# Patient Record
Sex: FEMALE | Race: BLACK OR AFRICAN AMERICAN | NOT HISPANIC OR LATINO | URBAN - METROPOLITAN AREA
[De-identification: names, ages, dates, MRNs, and addresses within clinical notes are randomized per-mention and may not be internally consistent; named-entity substitution may affect disease eponyms.]

---
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
## 2023-04-25 ENCOUNTER — EMERGENCY (EMERGENCY)
Facility: HOSPITAL | Age: 23
LOS: 0 days | Discharge: HOME | End: 2023-04-25
Attending: EMERGENCY MEDICINE
Payer: MEDICAID

## 2023-04-25 VITALS
HEART RATE: 82 BPM | SYSTOLIC BLOOD PRESSURE: 144 MMHG | DIASTOLIC BLOOD PRESSURE: 91 MMHG | RESPIRATION RATE: 17 BRPM | OXYGEN SATURATION: 100 %

## 2023-04-25 VITALS
RESPIRATION RATE: 18 BRPM | DIASTOLIC BLOOD PRESSURE: 83 MMHG | SYSTOLIC BLOOD PRESSURE: 150 MMHG | OXYGEN SATURATION: 100 % | HEART RATE: 109 BPM | HEIGHT: 65 IN | TEMPERATURE: 99 F | WEIGHT: 130.07 LBS

## 2023-04-25 DIAGNOSIS — L60.0 INGROWING NAIL: ICD-10-CM

## 2023-04-25 DIAGNOSIS — Z86.79 PERSONAL HISTORY OF OTHER DISEASES OF THE CIRCULATORY SYSTEM: ICD-10-CM

## 2023-04-25 DIAGNOSIS — M20.5X1 OTHER DEFORMITIES OF TOE(S) (ACQUIRED), RIGHT FOOT: ICD-10-CM

## 2023-04-25 DIAGNOSIS — L92.9 GRANULOMATOUS DISORDER OF THE SKIN AND SUBCUTANEOUS TISSUE, UNSPECIFIED: ICD-10-CM

## 2023-04-25 DIAGNOSIS — M20.5X2 OTHER DEFORMITIES OF TOE(S) (ACQUIRED), LEFT FOOT: ICD-10-CM

## 2023-04-25 PROBLEM — R01.1 CARDIAC MURMUR, UNSPECIFIED: Chronic | Status: ACTIVE | Noted: 2021-11-11

## 2023-04-25 LAB
ALBUMIN SERPL ELPH-MCNC: 4.1 G/DL — SIGNIFICANT CHANGE UP (ref 3.5–5.2)
ALP SERPL-CCNC: 37 U/L — SIGNIFICANT CHANGE UP (ref 30–115)
ALT FLD-CCNC: <5 U/L — SIGNIFICANT CHANGE UP (ref 0–41)
ANION GAP SERPL CALC-SCNC: 8 MMOL/L — SIGNIFICANT CHANGE UP (ref 7–14)
ANISOCYTOSIS BLD QL: SLIGHT — SIGNIFICANT CHANGE UP
AST SERPL-CCNC: 83 U/L — HIGH (ref 0–41)
BASOPHILS # BLD AUTO: 0 K/UL — SIGNIFICANT CHANGE UP (ref 0–0.2)
BASOPHILS NFR BLD AUTO: 0 % — SIGNIFICANT CHANGE UP (ref 0–1)
BILIRUB SERPL-MCNC: <0.2 MG/DL — SIGNIFICANT CHANGE UP (ref 0.2–1.2)
BUN SERPL-MCNC: 8 MG/DL — LOW (ref 10–20)
CALCIUM SERPL-MCNC: 8.9 MG/DL — SIGNIFICANT CHANGE UP (ref 8.4–10.5)
CHLORIDE SERPL-SCNC: 105 MMOL/L — SIGNIFICANT CHANGE UP (ref 98–110)
CO2 SERPL-SCNC: 21 MMOL/L — SIGNIFICANT CHANGE UP (ref 17–32)
CREAT SERPL-MCNC: 0.7 MG/DL — SIGNIFICANT CHANGE UP (ref 0.7–1.5)
CRP SERPL-MCNC: <3 MG/L — SIGNIFICANT CHANGE UP
EGFR: 126 ML/MIN/1.73M2 — SIGNIFICANT CHANGE UP
EOSINOPHIL # BLD AUTO: 0.04 K/UL — SIGNIFICANT CHANGE UP (ref 0–0.7)
EOSINOPHIL NFR BLD AUTO: 0.9 % — SIGNIFICANT CHANGE UP (ref 0–8)
ERYTHROCYTE [SEDIMENTATION RATE] IN BLOOD: 45 MM/HR — HIGH (ref 0–20)
GLUCOSE SERPL-MCNC: 99 MG/DL — SIGNIFICANT CHANGE UP (ref 70–99)
HCT VFR BLD CALC: 30.5 % — LOW (ref 37–47)
HGB BLD-MCNC: 9.2 G/DL — LOW (ref 12–16)
HYPOCHROMIA BLD QL: SLIGHT — SIGNIFICANT CHANGE UP
LACTATE SERPL-SCNC: 2.3 MMOL/L — HIGH (ref 0.7–2)
LYMPHOCYTES # BLD AUTO: 1.5 K/UL — SIGNIFICANT CHANGE UP (ref 1.2–3.4)
LYMPHOCYTES # BLD AUTO: 31.8 % — SIGNIFICANT CHANGE UP (ref 20.5–51.1)
MCHC RBC-ENTMCNC: 21.9 PG — LOW (ref 27–31)
MCHC RBC-ENTMCNC: 30.2 G/DL — LOW (ref 32–37)
MCV RBC AUTO: 72.4 FL — LOW (ref 81–99)
METAMYELOCYTES # FLD: 1.8 % — HIGH (ref 0–0)
MICROCYTES BLD QL: SLIGHT — SIGNIFICANT CHANGE UP
MONOCYTES # BLD AUTO: 0.46 K/UL — SIGNIFICANT CHANGE UP (ref 0.1–0.6)
MONOCYTES NFR BLD AUTO: 9.7 % — HIGH (ref 1.7–9.3)
MYELOCYTES NFR BLD: 0.9 % — HIGH (ref 0–0)
NEUTROPHILS # BLD AUTO: 2.29 K/UL — SIGNIFICANT CHANGE UP (ref 1.4–6.5)
NEUTROPHILS NFR BLD AUTO: 48.7 % — SIGNIFICANT CHANGE UP (ref 42.2–75.2)
NRBC # BLD: 1 /100 — HIGH (ref 0–0)
NRBC # BLD: SIGNIFICANT CHANGE UP /100 WBCS (ref 0–0)
PLAT MORPH BLD: SIGNIFICANT CHANGE UP
PLATELET # BLD AUTO: 436 K/UL — HIGH (ref 130–400)
PMV BLD: 11 FL — HIGH (ref 7.4–10.4)
POTASSIUM SERPL-MCNC: SIGNIFICANT CHANGE UP MMOL/L (ref 3.5–5)
POTASSIUM SERPL-SCNC: SIGNIFICANT CHANGE UP MMOL/L (ref 3.5–5)
PROT SERPL-MCNC: 9.2 G/DL — HIGH (ref 6–8)
RBC # BLD: 4.21 M/UL — SIGNIFICANT CHANGE UP (ref 4.2–5.4)
RBC # FLD: 19.4 % — HIGH (ref 11.5–14.5)
RBC BLD AUTO: NORMAL — SIGNIFICANT CHANGE UP
SODIUM SERPL-SCNC: 134 MMOL/L — LOW (ref 135–146)
VARIANT LYMPHS # BLD: 6.2 % — HIGH (ref 0–5)
WBC # BLD: 4.71 K/UL — LOW (ref 4.8–10.8)
WBC # FLD AUTO: 4.71 K/UL — LOW (ref 4.8–10.8)

## 2023-04-25 PROCEDURE — 99156 MOD SED OTH PHYS/QHP 5/>YRS: CPT | Mod: XU

## 2023-04-25 PROCEDURE — 99284 EMERGENCY DEPT VISIT MOD MDM: CPT

## 2023-04-25 PROCEDURE — 87040 BLOOD CULTURE FOR BACTERIA: CPT

## 2023-04-25 PROCEDURE — 36415 COLL VENOUS BLD VENIPUNCTURE: CPT

## 2023-04-25 PROCEDURE — 73630 X-RAY EXAM OF FOOT: CPT | Mod: 50

## 2023-04-25 PROCEDURE — 73630 X-RAY EXAM OF FOOT: CPT | Mod: 26,LT,RT

## 2023-04-25 PROCEDURE — 86140 C-REACTIVE PROTEIN: CPT

## 2023-04-25 PROCEDURE — 99285 EMERGENCY DEPT VISIT HI MDM: CPT

## 2023-04-25 PROCEDURE — 96374 THER/PROPH/DIAG INJ IV PUSH: CPT | Mod: XU

## 2023-04-25 PROCEDURE — 83605 ASSAY OF LACTIC ACID: CPT

## 2023-04-25 PROCEDURE — 85025 COMPLETE CBC W/AUTO DIFF WBC: CPT

## 2023-04-25 PROCEDURE — 99284 EMERGENCY DEPT VISIT MOD MDM: CPT | Mod: 25

## 2023-04-25 PROCEDURE — 85652 RBC SED RATE AUTOMATED: CPT

## 2023-04-25 PROCEDURE — 11765 WEDGE EXCISION SKN NAIL FOLD: CPT

## 2023-04-25 PROCEDURE — 80053 COMPREHEN METABOLIC PANEL: CPT

## 2023-04-25 RX ORDER — PROPOFOL 10 MG/ML
20 INJECTION, EMULSION INTRAVENOUS ONCE
Refills: 0 | Status: COMPLETED | OUTPATIENT
Start: 2023-04-25 | End: 2023-04-25

## 2023-04-25 RX ORDER — ALPRAZOLAM 0.25 MG
0.25 TABLET ORAL ONCE
Refills: 0 | Status: DISCONTINUED | OUTPATIENT
Start: 2023-04-25 | End: 2023-04-25

## 2023-04-25 RX ORDER — PROPOFOL 10 MG/ML
60 INJECTION, EMULSION INTRAVENOUS ONCE
Refills: 0 | Status: COMPLETED | OUTPATIENT
Start: 2023-04-25 | End: 2023-04-25

## 2023-04-25 RX ORDER — CEPHALEXIN 500 MG
1 CAPSULE ORAL
Qty: 28 | Refills: 0
Start: 2023-04-25 | End: 2023-05-01

## 2023-04-25 RX ORDER — CEFAZOLIN SODIUM 1 G
2000 VIAL (EA) INJECTION ONCE
Refills: 0 | Status: COMPLETED | OUTPATIENT
Start: 2023-04-25 | End: 2023-04-25

## 2023-04-25 RX ADMIN — PROPOFOL 60 MILLIGRAM(S): 10 INJECTION, EMULSION INTRAVENOUS at 17:22

## 2023-04-25 RX ADMIN — Medication 100 MILLIGRAM(S): at 14:25

## 2023-04-25 RX ADMIN — PROPOFOL 20 MILLIGRAM(S): 10 INJECTION, EMULSION INTRAVENOUS at 17:27

## 2023-04-25 RX ADMIN — Medication 0.25 MILLIGRAM(S): at 15:39

## 2023-04-25 RX ADMIN — PROPOFOL 20 MILLIGRAM(S): 10 INJECTION, EMULSION INTRAVENOUS at 17:31

## 2023-04-25 NOTE — ED PROVIDER NOTE - ATTENDING APP SHARED VISIT CONTRIBUTION OF CARE
23-year-old female without significant past medical history now presents with bilateral toes ulcerations and irritation.  Started with ingrown toenail.  Still went to work.  Got worse to the point there is some drainage involved.    Exam as noted.  Nontoxic.  Bilateral toe great toe erythema and soft tissue drainage.  No history of diabetes in the family.    Podiatry evaluation.

## 2023-04-25 NOTE — ED PROCEDURE NOTE - NS_POSTPROCCAREGUIDE_ED_ALL_ED
Patient is now fully awake, with vital signs and temperature stable, hydration is adequate, patients Puja’s  score is at baseline (or greater than 8), patient and escort has received  discharge education.

## 2023-04-25 NOTE — ED ADULT TRIAGE NOTE - CHIEF COMPLAINT QUOTE
Pt c/o b/l big toe swelling and drainage, pt states it started as ingrown toenails. Both toes have open wounds with malodorous drainage

## 2023-04-25 NOTE — CONSULT NOTE ADULT - SUBJECTIVE AND OBJECTIVE BOX
Podiatry Consult Note    Subjective:  AROLDO GARRIDO  Seen Bedside 23y Female  .   Patient is a 23y old  Female who presents with a chief complaint of ingrowing hallucal nails  HPI: States she noticed the nail ingrowing with the masses forming about 3 months ago and it has continually worsened over the last few months. Complaining of drainage and pain to the area. Has not previously had ingrown nails before.   Denies trauma to bilateral feet.    Past Medical History and Surgical History  PAST MEDICAL & SURGICAL HISTORY:  Heart murmur           Review of Systems:  [X] Ten point review of systems is otherwise negative except as noted     Objective:  Vital Signs Last 24 Hrs  T(C): 37.2 (25 Apr 2023 12:52), Max: 37.2 (25 Apr 2023 12:52)  T(F): 99 (25 Apr 2023 12:52), Max: 99 (25 Apr 2023 12:52)  HR: 82 (25 Apr 2023 17:40) (82 - 120)  BP: 150/98 (25 Apr 2023 17:40) (127/73 - 153/94)  BP(mean): --  RR: 18 (25 Apr 2023 17:40) (18 - 18)  SpO2: 100% (25 Apr 2023 17:40) (100% - 100%)    Parameters below as of 25 Apr 2023 17:40  Patient On (Oxygen Delivery Method): nasal cannula  O2 Flow (L/min): 2                          9.2    4.71  )-----------( 436      ( 25 Apr 2023 13:49 )             30.5                 04-25    134<L>  |  105  |  8<L>  ----------------------------<  99  TNP   |  21  |  0.7    Ca    8.9      25 Apr 2023 13:49    TPro  9.2<H>  /  Alb  4.1  /  TBili  <0.2  /  DBili  x   /  AST  83<H>  /  ALT  <5  /  AlkPhos  37  04-25        Physical Exam - Lower Extremity Focused:   Derm: Ingrowing, elongated, thickened hallucal nail bilateral feet with large granuloma protruding from nail bed, malodor noted, no other open lesions or wounds noted, moderate drainage from affected nails  Vascular: DP and PT Pulses palpable; Foot is Warm to Warm to the touch; Capillary Refill Time < 3 Seconds;    Neuro: Gross and light touch sensation intact   MSK: Pain On Palpation at Wound Site     Assessment:  Ingrowing nail, first toe bilateral feet  Granuloma, bilateral feet first toe    Plan:  Chart reviewed and Patient evaluated. All Questions and Concerns Addressed and Answered  XR Imaging  Foot; results reviewed; no acute findings  Bilateral total nail avulsion performed of first digits; see procedural sedation note  Recommend MRI outpatient of bilateral feet for hallux evaluation per radiology   Local Wound Care; Wound Flushed w/ NS; Wound Packed w/ Betadine Soaked adaptic/ DSD / Kerlix / ACE  Weight Bearing Status; WBAT w/surgical shoe  Patient stable from podiatry standpoint for discharge; [facility]. Carrizales @ [address] [place], [place] [address]  Discussed Plan w/ Dr. Carrizales     Podiatry

## 2023-04-25 NOTE — ED PROVIDER NOTE - NSFOLLOWUPINSTRUCTIONS_ED_ALL_ED_FT
Conscious Sedation, Adult, Care After    Refer to this sheet in the next few weeks. These instructions provide you with information on caring for yourself after your procedure. Your health care provider may also give you more specific instructions. Your treatment has been planned according to current medical practices, but problems sometimes occur. Call your health care provider if you have any problems or questions after your procedure.    WHAT TO EXPECT AFTER THE PROCEDURE  After your procedure:    You may feel sleepy, clumsy, and have poor balance for several hours.   Vomiting may occur if you eat too soon after the procedure.     HOME CARE INSTRUCTIONS  Do not participate in any activities where you could become injured for at least 24 hours. Do not:  Drive.  Swim.  Ride a bicycle.  Operate heavy machinery.  Cook.  Use power tools.  Climb ladders.  Work from a high place.  Do not make important decisions or sign legal documents until you are improved.  If you vomit, drink water, juice, or soup when you can drink without vomiting. Make sure you have little or no nausea before eating solid foods.  Only take over-the-counter or prescription medicines for pain, discomfort, or fever as directed by your health care provider.  Make sure you and your family fully understand everything about the medicines given to you, including what side effects may occur.  You should not drink alcohol, take sleeping pills, or take medicines that cause drowsiness for at least 24 hours.  If you smoke, do not smoke without supervision.  If you are feeling better, you may resume normal activities 24 hours after you were sedated.  Keep all appointments with your health care provider.    SEEK MEDICAL CARE IF:  Your skin is pale or bluish in color.  You continue to feel nauseous or vomit.  Your pain is getting worse and is not helped by medicine.  You have bleeding or swelling.  You are still sleepy or feeling clumsy after 24 hours.    SEEK IMMEDIATE MEDICAL CARE IF:  You develop a rash.  You have difficulty breathing.  You develop any type of allergic problem.  You have a fever.    MAKE SURE YOU:  Understand these instructions.  Will watch your condition.  Will get help right away if you are not doing well or get worse.    ADDITIONAL NOTES AND INSTRUCTIONS    Please follow up with your Primary MD in 24-48 hr.  Seek immediate medical care for any new/worsening signs or symptoms.

## 2023-04-25 NOTE — CONSULT NOTE ADULT - ATTENDING COMMENTS
23 y.o female with sever ingrown nails b/l halluces  Notes presence of ingrown >6 month. Patient has been neglecting to seek medical care  bilateral total nail avulsions performed under sedation and w/ local anesthesia. Performed under aseptic technique   xrays reviewed: no OM  Recommend Keflex 500mg tid x 7 days  recommend MRI as outpatient   follow up as outpatient

## 2023-04-25 NOTE — ED PROVIDER NOTE - CLINICAL SUMMARY MEDICAL DECISION MAKING FREE TEXT BOX
Ingrown nails.  Removed by podiatry.  DC with p.o. antibiotics.  Labs were ordered and reviewed by me.  Imaging was ordered and reviewed by me. Ingrown nails.  Removed by podiatry.  DC with p.o. antibiotics.  Labs were ordered and reviewed by me.  Imaging was ordered and reviewed by me.    Ignacio:23-year-old female present to the ED for bilateral ingrown toenails.  Evaluated by the initial team who consulted podiatry.  Decision was made to remove ingrown channel but due to the extent of patient's ingrown nail bed the decision was made to use sedation to extract both toenails.  Propofol was given with local bilateral first toe digit blocks.  Both nails were successfully removed.  Patient was reassessed for an hour after.  Vital stable.  Discharged home.  Ambulatory at baseline.

## 2023-04-25 NOTE — ED PROVIDER NOTE - PHYSICAL EXAMINATION
VITAL SIGNS: I have reviewed nursing notes and confirm.  CONSTITUTIONAL: Patient is in no acute distress.  SKIN: See below.   HEAD: Normocephalic; atraumatic.  EYES: PERRL, EOM intact; conjunctiva and sclera clear.  ENT: No nasal discharge; airway clear.   NECK: Supple; non tender.  CARD: S1, S2 normal; no murmurs, gallops, or rubs. Regular rate and rhythm.  RESP: Clear to auscultation bilaterally. No wheezes, rales or rhonchi.  ABD: Normal bowel sounds; soft; non-distended; non-tender.   EXT: Left toe with erythema at edges of nail with swelling. Right toe with increasing swelling, erythema to distal toe, side of nail, with foul odorous drainage.   LYMPH: No acute cervical adenopathy.  NEURO: Alert, oriented. Grossly unremarkable. No focal deficits.  PSYCH: Cooperative, appropriate.

## 2023-04-25 NOTE — ED PROVIDER NOTE - OBJECTIVE STATEMENT
22 yo F with no pmhx presenting for worsening toe wound over the last few months. Patient states that it started as an ingrown toe nail and has been worsening.   No fevers or chills. Notes drainage from toes.

## 2023-04-25 NOTE — ED PROVIDER NOTE - PATIENT PORTAL LINK FT
You can access the FollowMyHealth Patient Portal offered by [facility] by registering at the following website: [email or web address]. By joining [employer]’s FollowMyHealth portal, you will also be able to view your health information using other applications (apps) compatible with our system.

## 2023-04-25 NOTE — ED ADULT NURSE NOTE - AS PAIN REST
0 (no pain/absence of nonverbal indicators of pain) Griseofulvin Counseling:  I discussed with the patient the risks of griseofulvin including but not limited to photosensitivity, cytopenia, liver damage, nausea/vomiting and severe allergy.  The patient understands that this medication is best absorbed when taken with a fatty meal (e.g., ice cream or french fries).

## 2023-04-25 NOTE — ED ADULT NURSE NOTE - OBJECTIVE STATEMENT
Pt reports to ed c/o BL big toe infection xfew mo. Pt states two ingrown toenails happened after getting pedicure. Reports she tried to tx infection @ home. Has not seen provider for toees. Odor observed. Ptr reports yellow drainage form both toes. Reports tenderness upon touch.

## 2023-04-25 NOTE — ED PROVIDER NOTE - NS ED ATTENDING STATEMENT MOD
This was a shared visit with the [facility]. I reviewed and verified the documentation and independently performed the documented:

## 2023-04-29 LAB
POIKILOCYTOSIS BLD QL AUTO: SLIGHT — SIGNIFICANT CHANGE UP
POLYCHROMASIA BLD QL SMEAR: SLIGHT — SIGNIFICANT CHANGE UP
SMUDGE CELLS # BLD: PRESENT — SIGNIFICANT CHANGE UP
TOXIC GRANULES BLD QL SMEAR: PRESENT — SIGNIFICANT CHANGE UP

## 2023-04-30 LAB
CULTURE RESULTS: SIGNIFICANT CHANGE UP
SPECIMEN SOURCE: SIGNIFICANT CHANGE UP

## 2023-05-01 LAB
CULTURE RESULTS: SIGNIFICANT CHANGE UP
SPECIMEN SOURCE: SIGNIFICANT CHANGE UP

## 2023-08-28 ENCOUNTER — EMERGENCY (EMERGENCY)
Facility: HOSPITAL | Age: 23
LOS: 0 days | Discharge: HOME | End: 2023-08-28
Attending: STUDENT IN AN ORGANIZED HEALTH CARE EDUCATION/TRAINING PROGRAM
Payer: MEDICAID

## 2023-08-28 VITALS
OXYGEN SATURATION: 99 % | HEART RATE: 102 BPM | SYSTOLIC BLOOD PRESSURE: 139 MMHG | DIASTOLIC BLOOD PRESSURE: 97 MMHG | WEIGHT: 139.99 LBS | RESPIRATION RATE: 18 BRPM | TEMPERATURE: 98 F

## 2023-08-28 VITALS
RESPIRATION RATE: 18 BRPM | DIASTOLIC BLOOD PRESSURE: 90 MMHG | SYSTOLIC BLOOD PRESSURE: 130 MMHG | OXYGEN SATURATION: 99 % | TEMPERATURE: 98 F | HEART RATE: 98 BPM

## 2023-08-28 DIAGNOSIS — N91.2 AMENORRHEA, UNSPECIFIED: ICD-10-CM

## 2023-08-28 DIAGNOSIS — R10.9 UNSPECIFIED ABDOMINAL PAIN: ICD-10-CM

## 2023-08-28 PROCEDURE — 99283 EMERGENCY DEPT VISIT LOW MDM: CPT

## 2023-08-28 PROCEDURE — 99282 EMERGENCY DEPT VISIT SF MDM: CPT

## 2023-08-28 NOTE — ED PROVIDER NOTE - NSFOLLOWUPCLINICS_GEN_ALL_ED_FT
[facility]  OB/GYN  [address] [place] [address]  Phone: [phone]  Fax:   Follow Up Time: 1-3 Days

## 2023-08-28 NOTE — ED PROVIDER NOTE - ATTENDING CONTRIBUTION TO CARE
22 yo female, no PMHx, presents for abnormal menses. She states she had regular monthly menses but then 3-4 months ago, she stopped getting her periods and had a single episode of spotting. Yesterday, she had an episode of NBNB vomiting but that subsided on its own. Patient went to urgent care 2 weeks ago where she had a negative urine pregnancy. She is sexually active. Denies vaginal discharge or itching.     CONSTITUTIONAL: Well-developed; well-nourished; in no acute distress.   SKIN: warm, dry  HEAD: Normocephalic  NECK: Supple; non tender.  CARD: S1, S2 normal; Regular rate and rhythm.   RESP: No wheezes, rales or rhonchi.  ABD: soft ntnd  EXT: Normal ROM.  No clubbing, cyanosis or edema.   NEURO: Alert, oriented, grossly unremarkable  PSYCH: Cooperative, appropriate.

## 2023-08-28 NOTE — ED PROVIDER NOTE - NSFOLLOWUPINSTRUCTIONS_ED_ALL_ED_FT
Our Emergency Department Referral Coordinators will be reaching out to you in the next 24-48 hours from 9:00am to 5:00pm (Monday - Friday) with a follow up appointment. Please expect a phone call from the hospital in that time frame. If you do not receive a call or if you have any questions or concerns, you can reach them at [phone] or [phone].  ~~~  Medical Screening Exam       A medical screening exam helps determine whether or not you need immediate medical treatment. This type of exam may be done in the emergency department, an urgent care setting, or your health care provider's office.  During the exam, a health care provider does a short physical exam and asks about your medical history to assess:  •Your current symptoms.      •Your overall health.      Depending on your symptoms, you may need additional tests.      What are the possible outcomes of a medical screening exam?  Your medical screening exam may determine that:  •You do not need emergency treatment at this time.      •You need treatment right away.      •You need to be transferred to another medical center.      •You need to have more tests.      A medical specialist may be consulted if necessary.      When should I seek medical care?    If you have a regular health care provider, make an appointment for a follow-up visit with him or her. If you do not have a regular health care provider, ask about resources in your community.      Get help right away if:    •Your condition gets worse or you develop new or troubling symptoms before you see your health care provider. If this occurs, go to an emergency department right away.      In an emergency:     •Call 911 or have someone drive you to the nearest hospital.      • Do not drive yourself.        Summary    •A medical screening exam helps determine whether or not you need immediate medical treatment.      •During the exam, a health care provider does a short physical exam and asks about your current symptoms and overall health.      •More tests may be ordered during the exam.      •You may need to be transferred to another medical center.

## 2023-08-28 NOTE — ED PROVIDER NOTE - CLINICAL SUMMARY MEDICAL DECISION MAKING FREE TEXT BOX
Patient presenting for lack of menses x3-4 months. Negative pregnancy test 2 weeks ago and at today's visit. Discussed follow up with OB-gyn and outpatient follow up. Patient has established follow up with Dr. Mata and is comfortable with outpatient follow up.

## 2023-08-28 NOTE — ED ADULT NURSE NOTE - NSSEPSISNEWALTERMENTAL_ED_A_ED
Detail Level: Simple
Additional Notes: Discussed treatment options with IPL, PDT, or LN2. She elects to do a test spot with LN2 today because she does not like the down time required for healing after laser/light treatments.\\nShe may RTC for treatment of other 2 lesions with LN2 if desired.\\n\\nPatient is to continue her nightly retinol to the face.
No

## 2023-08-28 NOTE — ED ADULT NURSE NOTE - AS PAIN REST
0 (no pain/absence of nonverbal indicators of pain)
Comment: ACD of lower back/top of gluteal cleft, mild severity on exam today but patient has been applying topical steroids past few days. Likely triggered by new pair of unwashed underwear. Has improved with use of betamethasone augmented cream the past few days which patient has due to history of hand eczema. Recommend continuing and plan to add hydroxyzine as patient reports that pruritus is interfering with sleeping at night. Advised to f/u if not resolved in 4 weeks.
Detail Level: Simple

## 2023-08-28 NOTE — ED ADULT NURSE NOTE - NSFALLUNIVINTERV_ED_ALL_ED
Bed/Stretcher in lowest position, wheels locked, appropriate side rails in place/Call bell, personal items and telephone in reach/Instruct patient to call for assistance before getting out of bed/chair/stretcher/Non-slip footwear applied when patient is off stretcher/[place] to call system/Physically safe environment - no spills, clutter or unnecessary equipment/Purposeful proactive rounding/Room/bathroom lighting operational, light cord in reach

## 2023-08-28 NOTE — ED PROVIDER NOTE - PATIENT PORTAL LINK FT
You can access the FollowMyHealth Patient Portal offered by [facility] by registering at the following website: [email or web address]. By joining [employer]’s FollowMyHealth portal, you will also be able to view your health information using other applications (apps) compatible with our system.

## 2023-08-28 NOTE — ED PROVIDER NOTE - OBJECTIVE STATEMENT
24 yo f no pmh, sexually active with 1 male partner, no ho STD presents with amenorrhea x 3-4 mos. Patient admits to not having a period for 3-4 mos but only having intermittent vaginal spotting 1 mos ago. Admits to intermittent abd cramping over the last few months. Has OGKWAKU Mata and saw in Jan with no acute disturbances

## 2023-08-28 NOTE — ED PROVIDER NOTE - ATTENDING WITH...
Quality 110: Preventive Care And Screening: Influenza Immunization: Influenza Immunization Administered during Influenza season Detail Level: Detailed Quality 111:Pneumonia Vaccination Status For Older Adults: Pneumococcal vaccine was not administered on or after patient’s 60th birthday and before the end of the measurement period, reason not otherwise specified Quality 130: Documentation Of Current Medications In The Medical Record: Current Medications Documented Quality 402: Tobacco Use And Help With Quitting Among Adolescents: Patient screened for tobacco and is an ex-smoker Quality 47: Advance Care Plan: Advance care planning not documented, reason not otherwise specified. Quality 431: Preventive Care And Screening: Unhealthy Alcohol Use - Screening: Patient not identified as an unhealthy alcohol user when screened for unhealthy alcohol use using a systematic screening method Resident

## 2023-10-16 ENCOUNTER — LABORATORY RESULT (OUTPATIENT)
Age: 23
End: 2023-10-16

## 2023-10-16 ENCOUNTER — APPOINTMENT (OUTPATIENT)
Dept: OBGYN | Facility: CLINIC | Age: 23
End: 2023-10-16
Payer: MEDICAID

## 2023-10-16 ENCOUNTER — OUTPATIENT (OUTPATIENT)
Dept: OUTPATIENT SERVICES | Facility: HOSPITAL | Age: 23
LOS: 1 days | End: 2023-10-16

## 2023-10-16 ENCOUNTER — OUTPATIENT (OUTPATIENT)
Dept: OUTPATIENT SERVICES | Facility: HOSPITAL | Age: 23
LOS: 1 days | End: 2023-10-16
Payer: MEDICAID

## 2023-10-16 VITALS — SYSTOLIC BLOOD PRESSURE: 100 MMHG | DIASTOLIC BLOOD PRESSURE: 70 MMHG | WEIGHT: 137 LBS

## 2023-10-16 DIAGNOSIS — Z00.00 ENCOUNTER FOR GENERAL ADULT MEDICAL EXAMINATION WITHOUT ABNORMAL FINDINGS: ICD-10-CM

## 2023-10-16 DIAGNOSIS — Z01.419 ENCOUNTER FOR GYNECOLOGICAL EXAMINATION (GENERAL) (ROUTINE) W/OUT ABNORMAL FINDINGS: ICD-10-CM

## 2023-10-16 DIAGNOSIS — R10.2 PELVIC AND PERINEAL PAIN: ICD-10-CM

## 2023-10-16 PROCEDURE — 99395 PREV VISIT EST AGE 18-39: CPT

## 2023-10-16 PROCEDURE — 87801 DETECT AGNT MULT DNA AMPLI: CPT

## 2023-10-16 PROCEDURE — 87661 TRICHOMONAS VAGINALIS AMPLIF: CPT

## 2023-10-16 PROCEDURE — 87491 CHLMYD TRACH DNA AMP PROBE: CPT

## 2023-10-16 PROCEDURE — 87591 N.GONORRHOEAE DNA AMP PROB: CPT

## 2023-10-16 PROCEDURE — 81025 URINE PREGNANCY TEST: CPT

## 2023-10-16 PROCEDURE — 99214 OFFICE O/P EST MOD 30 MIN: CPT | Mod: 25

## 2023-10-16 PROCEDURE — 87625 HPV TYPES 16 & 18 ONLY: CPT

## 2023-10-16 PROCEDURE — 87798 DETECT AGENT NOS DNA AMP: CPT

## 2023-10-16 PROCEDURE — 88142 CYTOPATH C/V THIN LAYER: CPT

## 2023-10-17 ENCOUNTER — OUTPATIENT (OUTPATIENT)
Dept: OUTPATIENT SERVICES | Facility: HOSPITAL | Age: 23
LOS: 1 days | End: 2023-10-17

## 2023-10-17 DIAGNOSIS — N91.1 SECONDARY AMENORRHEA: ICD-10-CM

## 2023-10-17 DIAGNOSIS — R10.2 PELVIC AND PERINEAL PAIN: ICD-10-CM

## 2023-10-17 DIAGNOSIS — Z01.419 ENCOUNTER FOR GYNECOLOGICAL EXAMINATION (GENERAL) (ROUTINE) WITHOUT ABNORMAL FINDINGS: ICD-10-CM

## 2023-10-18 DIAGNOSIS — N91.1 SECONDARY AMENORRHEA: ICD-10-CM

## 2023-10-19 LAB
A VAGINAE DNA VAG QL NAA+PROBE: ABNORMAL
BVAB2 DNA VAG QL NAA+PROBE: NORMAL
C KRUSEI DNA VAG QL NAA+PROBE: NEGATIVE
C TRACH RRNA SPEC QL NAA+PROBE: NEGATIVE
CANDIDA DNA VAG QL NAA+PROBE: NEGATIVE
MEGA1 DNA VAG QL NAA+PROBE: ABNORMAL
N GONORRHOEA RRNA SPEC QL NAA+PROBE: NEGATIVE
T VAGINALIS RRNA SPEC QL NAA+PROBE: NEGATIVE

## 2023-10-23 ENCOUNTER — OUTPATIENT (OUTPATIENT)
Dept: OUTPATIENT SERVICES | Facility: HOSPITAL | Age: 23
LOS: 1 days | End: 2023-10-23
Payer: MEDICAID

## 2023-10-23 ENCOUNTER — OUTPATIENT (OUTPATIENT)
Dept: OUTPATIENT SERVICES | Facility: HOSPITAL | Age: 23
LOS: 1 days | End: 2023-10-23

## 2023-10-23 PROCEDURE — 87624 HPV HI-RISK TYP POOLED RSLT: CPT

## 2023-10-25 DIAGNOSIS — N91.1 SECONDARY AMENORRHEA: ICD-10-CM

## 2023-10-26 DIAGNOSIS — N91.1 SECONDARY AMENORRHEA: ICD-10-CM

## 2023-10-29 LAB — CYTOLOGY CVX/VAG DOC THIN PREP: ABNORMAL

## 2023-10-30 ENCOUNTER — APPOINTMENT (OUTPATIENT)
Dept: OBGYN | Facility: CLINIC | Age: 23
End: 2023-10-30

## 2023-10-30 DIAGNOSIS — N91.1 SECONDARY AMENORRHEA: ICD-10-CM

## 2023-10-31 DIAGNOSIS — N91.1 SECONDARY AMENORRHEA: ICD-10-CM

## 2023-11-01 DIAGNOSIS — N91.1 SECONDARY AMENORRHEA: ICD-10-CM

## 2023-11-02 DIAGNOSIS — N91.1 SECONDARY AMENORRHEA: ICD-10-CM

## 2023-11-08 ENCOUNTER — OUTPATIENT (OUTPATIENT)
Dept: OUTPATIENT SERVICES | Facility: HOSPITAL | Age: 23
LOS: 1 days | End: 2023-11-08
Payer: MEDICAID

## 2023-11-08 ENCOUNTER — APPOINTMENT (OUTPATIENT)
Dept: ANTEPARTUM | Facility: CLINIC | Age: 23
End: 2023-11-08
Payer: MEDICAID

## 2023-11-08 ENCOUNTER — ASOB RESULT (OUTPATIENT)
Age: 23
End: 2023-11-08

## 2023-11-08 DIAGNOSIS — N91.1 SECONDARY AMENORRHEA: ICD-10-CM

## 2023-11-08 DIAGNOSIS — Z00.00 ENCOUNTER FOR GENERAL ADULT MEDICAL EXAMINATION WITHOUT ABNORMAL FINDINGS: ICD-10-CM

## 2023-11-08 PROCEDURE — 84439 ASSAY OF FREE THYROXINE: CPT

## 2023-11-08 PROCEDURE — 83001 ASSAY OF GONADOTROPIN (FSH): CPT

## 2023-11-08 PROCEDURE — 84443 ASSAY THYROID STIM HORMONE: CPT

## 2023-11-08 PROCEDURE — 85027 COMPLETE CBC AUTOMATED: CPT

## 2023-11-08 PROCEDURE — 76856 US EXAM PELVIC COMPLETE: CPT | Mod: 26,59

## 2023-11-08 PROCEDURE — 87389 HIV-1 AG W/HIV-1&-2 AB AG IA: CPT

## 2023-11-08 PROCEDURE — 76830 TRANSVAGINAL US NON-OB: CPT | Mod: 26

## 2023-11-08 PROCEDURE — 76830 TRANSVAGINAL US NON-OB: CPT

## 2023-11-08 PROCEDURE — 80053 COMPREHEN METABOLIC PANEL: CPT

## 2023-11-08 PROCEDURE — 86803 HEPATITIS C AB TEST: CPT

## 2023-11-08 PROCEDURE — 82670 ASSAY OF TOTAL ESTRADIOL: CPT

## 2023-11-08 PROCEDURE — 76856 US EXAM PELVIC COMPLETE: CPT

## 2023-11-08 PROCEDURE — 87340 HEPATITIS B SURFACE AG IA: CPT

## 2023-11-08 PROCEDURE — 84146 ASSAY OF PROLACTIN: CPT

## 2023-11-08 PROCEDURE — 86706 HEP B SURFACE ANTIBODY: CPT

## 2023-11-08 PROCEDURE — 83520 IMMUNOASSAY QUANT NOS NONAB: CPT

## 2023-11-09 DIAGNOSIS — N91.1 SECONDARY AMENORRHEA: ICD-10-CM

## 2023-11-10 DIAGNOSIS — N92.6 IRREGULAR MENSTRUATION, UNSPECIFIED: ICD-10-CM

## 2023-11-10 DIAGNOSIS — R10.2 PELVIC AND PERINEAL PAIN: ICD-10-CM

## 2023-11-10 DIAGNOSIS — N85.4 MALPOSITION OF UTERUS: ICD-10-CM

## 2023-11-10 LAB
ALBUMIN SERPL ELPH-MCNC: 4.4 G/DL
ALP BLD-CCNC: 56 U/L
ALT SERPL-CCNC: 9 U/L
ANION GAP SERPL CALC-SCNC: 10 MMOL/L
ANTI-MUELLERIAN HORMONE: 0.03 NG/ML
AST SERPL-CCNC: 14 U/L
BILIRUB SERPL-MCNC: <0.2 MG/DL
BUN SERPL-MCNC: 8 MG/DL
CALCIUM SERPL-MCNC: 9.7 MG/DL
CHLORIDE SERPL-SCNC: 105 MMOL/L
CO2 SERPL-SCNC: 25 MMOL/L
CREAT SERPL-MCNC: 0.8 MG/DL
EGFR: 107 ML/MIN/1.73M2
ESTRADIOL SERPL-MCNC: 13 PG/ML
FSH SERPL-MCNC: 59.4 IU/L
GLUCOSE SERPL-MCNC: 93 MG/DL
HBV SURFACE AB SER QL: NONREACTIVE
HBV SURFACE AG SER QL: NONREACTIVE
HCT VFR BLD CALC: 33 %
HCV AB SER QL: NONREACTIVE
HCV S/CO RATIO: 0.25 S/CO
HGB BLD-MCNC: 9.6 G/DL
HIV1+2 AB SPEC QL IA.RAPID: NONREACTIVE
MCHC RBC-ENTMCNC: 21.4 PG
MCHC RBC-ENTMCNC: 29.1 G/DL
MCV RBC AUTO: 73.5 FL
PLATELET # BLD AUTO: 386 K/UL
PMV BLD: 10.8 FL
POTASSIUM SERPL-SCNC: 4.5 MMOL/L
PROLACTIN SERPL-MCNC: 16.3 NG/ML
PROT SERPL-MCNC: 8.5 G/DL
RBC # BLD: 4.49 M/UL
RBC # FLD: 21 %
SODIUM SERPL-SCNC: 140 MMOL/L
T4 FREE SERPL-MCNC: 1 NG/DL
TESTOST FREE SERPL-MCNC: 0.8 PG/ML
TESTOST SERPL-MCNC: 20 NG/DL
TSH SERPL-ACNC: 0.98 UIU/ML
WBC # FLD AUTO: 3.55 K/UL

## 2023-11-27 ENCOUNTER — APPOINTMENT (OUTPATIENT)
Dept: OBGYN | Facility: CLINIC | Age: 23
End: 2023-11-27
Payer: MEDICAID

## 2023-11-27 ENCOUNTER — OUTPATIENT (OUTPATIENT)
Dept: OUTPATIENT SERVICES | Facility: HOSPITAL | Age: 23
LOS: 1 days | End: 2023-11-27
Payer: MEDICAID

## 2023-11-27 DIAGNOSIS — N91.1 SECONDARY AMENORRHEA: ICD-10-CM

## 2023-11-27 DIAGNOSIS — R87.610 ATYPICAL SQUAMOUS CELLS OF UNDETERMINED SIGNIFICANCE ON CYTOLOGIC SMEAR OF CERVIX (ASC-US): ICD-10-CM

## 2023-11-27 DIAGNOSIS — Z71.2 PERSON CONSULTING FOR EXPLANATION OF EXAMINATION OR TEST FINDINGS: ICD-10-CM

## 2023-11-27 DIAGNOSIS — Z34.90 ENCOUNTER FOR SUPERVISION OF NORMAL PREGNANCY, UNSPECIFIED, UNSPECIFIED TRIMESTER: ICD-10-CM

## 2023-11-27 PROCEDURE — ZZZZZ: CPT

## 2023-11-27 PROCEDURE — 99213 OFFICE O/P EST LOW 20 MIN: CPT

## 2023-12-11 PROBLEM — N91.1 SECONDARY AMENORRHEA: Status: ACTIVE | Noted: 2023-10-16

## 2023-12-12 ENCOUNTER — OUTPATIENT (OUTPATIENT)
Dept: OUTPATIENT SERVICES | Facility: HOSPITAL | Age: 23
LOS: 1 days | End: 2023-12-12
Payer: COMMERCIAL

## 2023-12-12 ENCOUNTER — LABORATORY RESULT (OUTPATIENT)
Age: 23
End: 2023-12-12

## 2023-12-12 PROCEDURE — 83001 ASSAY OF GONADOTROPIN (FSH): CPT

## 2023-12-12 PROCEDURE — 84166 PROTEIN E-PHORESIS/URINE/CSF: CPT

## 2023-12-12 PROCEDURE — 84165 PROTEIN E-PHORESIS SERUM: CPT

## 2023-12-12 PROCEDURE — 83520 IMMUNOASSAY QUANT NOS NONAB: CPT

## 2023-12-12 PROCEDURE — 82670 ASSAY OF TOTAL ESTRADIOL: CPT

## 2023-12-12 PROCEDURE — 86334 IMMUNOFIX E-PHORESIS SERUM: CPT

## 2023-12-12 PROCEDURE — 84144 ASSAY OF PROGESTERONE: CPT

## 2023-12-28 LAB — ANTI-MUELLERIAN HORMONE: 0.03 NG/ML

## 2023-12-29 LAB
ALBUMIN MFR SERPL ELPH: 47.9 %
ALBUMIN SERPL-MCNC: 4.3 G/DL
ALBUMIN/GLOB SERPL: 0.9 RATIO
ALBUPE: 14.8 %
ALPHA1 GLOB MFR SERPL ELPH: 3.5 %
ALPHA1 GLOB SERPL ELPH-MCNC: 0.3 G/DL
ALPHA1UPE: 32.9 %
ALPHA2 GLOB MFR SERPL ELPH: 9.8 %
ALPHA2 GLOB SERPL ELPH-MCNC: 0.9 G/DL
ALPHA2UPE: 20.6 %
B-GLOBULIN MFR SERPL ELPH: 11.8 %
B-GLOBULIN SERPL ELPH-MCNC: 1.1 G/DL
BETAUPE: 17.6 %
ESTRADIOL SERPL-MCNC: 12 PG/ML
FSH SERPL-MCNC: 81.7 IU/L
GAMMA GLOB FLD ELPH-MCNC: 2.4 G/DL
GAMMA GLOB MFR SERPL ELPH: 27 %
GAMMAUPE: 14.1 %
IGA 24H UR QL IFE: NORMAL
INTERPRETATION SERPL IEP-IMP: NORMAL
KAPPA LC 24H UR QL: NORMAL
PROGEST SERPL-MCNC: 0.32 NG/ML
PROT PATTERN 24H UR ELPH-IMP: NORMAL
PROT SERPL-MCNC: 9 G/DL
PROT SERPL-MCNC: 9 G/DL
PROT UR-MCNC: 19 MG/DL
PROT UR-MCNC: 19 MG/DL

## 2024-01-08 ENCOUNTER — APPOINTMENT (OUTPATIENT)
Dept: OBGYN | Facility: CLINIC | Age: 24
End: 2024-01-08
Payer: COMMERCIAL

## 2024-01-08 ENCOUNTER — OUTPATIENT (OUTPATIENT)
Dept: OUTPATIENT SERVICES | Facility: HOSPITAL | Age: 24
LOS: 1 days | End: 2024-01-08
Payer: COMMERCIAL

## 2024-01-08 VITALS
WEIGHT: 146 LBS | DIASTOLIC BLOOD PRESSURE: 70 MMHG | BODY MASS INDEX: 28.66 KG/M2 | SYSTOLIC BLOOD PRESSURE: 118 MMHG | HEIGHT: 60 IN

## 2024-01-08 DIAGNOSIS — R87.810 CERVICAL HIGH RISK HUMAN PAPILLOMAVIRUS (HPV) DNA TEST POSITIVE: ICD-10-CM

## 2024-01-08 DIAGNOSIS — E28.39 OTHER PRIMARY OVARIAN FAILURE: ICD-10-CM

## 2024-01-08 PROCEDURE — 57454 BX/CURETT OF CERVIX W/SCOPE: CPT

## 2024-01-08 PROCEDURE — 99214 OFFICE O/P EST MOD 30 MIN: CPT | Mod: 25

## 2024-01-08 PROCEDURE — 81025 URINE PREGNANCY TEST: CPT

## 2024-01-08 PROCEDURE — 88305 TISSUE EXAM BY PATHOLOGIST: CPT

## 2024-01-08 PROCEDURE — 99214 OFFICE O/P EST MOD 30 MIN: CPT

## 2024-01-08 RX ORDER — MEDROXYPROGESTERONE ACETATE 10 MG/1
10 TABLET ORAL DAILY
Qty: 10 | Refills: 6 | Status: ACTIVE | COMMUNITY
Start: 2024-01-08 | End: 1900-01-01

## 2024-01-08 NOTE — PROCEDURE
[Colposcopy] : Colposcopy  [Time out performed] : Pre-procedure time out performed.  Patient's name, date of birth and procedure confirmed. [Consent Obtained] : Consent obtained [Risks] : risks [Benefits] : benefits [Alternatives] : alternatives [Patient] : patient [Infection] : infection [Bleeding] : bleeding [Allergic Reaction] : allergic reaction [ASCUS] : ASCUS [HPV High Risk] : HPV high risk [Pap Performed] : pap not performed [SCI Fully Visualized] : SCI fully visualized [ECC Performed] : ECC performed [No Abnormalities] : no abnormalities [Lesion] : lesion seen [Biopsy] : biopsy taken [Hemostasis Obtained] : Hemostasis obtained [Tolerated Well] : the patient tolerated the procedure well [de-identified] : 2 [de-identified] : WE at 12 oclock [de-identified] : 1) 12 oclock Cervical Biopsy 2) ECC [de-identified] : Likely JOSIANE 1

## 2024-01-08 NOTE — PLAN
[FreeTextEntry1] : Colpo done - likely JOSIANE 1.  Patient's amenorrhea and labs are c/w POF. R/O mosaic rivera. Chromosomes ordered Inhibin levels. HCG  Progesterone Challenge Test.

## 2024-01-08 NOTE — HISTORY OF PRESENT ILLNESS
[FreeTextEntry1] : 23 here for colpo and f/u of irregular periods. Last period: Nov 6- Nov 8. Nov 20-22.

## 2024-01-09 DIAGNOSIS — E28.39 OTHER PRIMARY OVARIAN FAILURE: ICD-10-CM

## 2024-01-09 DIAGNOSIS — R87.610 ATYPICAL SQUAMOUS CELLS OF UNDETERMINED SIGNIFICANCE ON CYTOLOGIC SMEAR OF CERVIX (ASC-US): ICD-10-CM

## 2024-01-09 NOTE — ED PEDIATRIC NURSE NOTE - CAS DISCH ACCOMP BY
Problem: PAIN - ADULT  Goal: Verbalizes/displays adequate comfort level or baseline comfort level  Description: Interventions:  - Encourage patient to monitor pain and request assistance  - Assess pain using appropriate pain scale  - Administer analgesics based on type and severity of pain and evaluate response  - Implement non-pharmacological measures as appropriate and evaluate response  - Consider cultural and social influences on pain and pain management  - Notify physician/advanced practitioner if interventions unsuccessful or patient reports new pain  Outcome: Progressing     Problem: INFECTION - ADULT  Goal: Absence or prevention of progression during hospitalization  Description: INTERVENTIONS:  - Assess and monitor for signs and symptoms of infection  - Monitor lab/diagnostic results  - Monitor all insertion sites, i.e. indwelling lines, tubes, and drains  - Monitor endotracheal if appropriate and nasal secretions for changes in amount and color  - [place] appropriate cooling/warming therapies per order  - Administer medications as ordered  - Instruct and encourage patient and family to use good hand hygiene technique  - Identify and instruct in appropriate isolation precautions for identified infection/condition  Outcome: Progressing  Goal: Absence of fever/infection during neutropenic period  Description: INTERVENTIONS:  - Monitor WBC    Outcome: Progressing     Problem: SAFETY ADULT  Goal: Patient will remain free of falls  Description: INTERVENTIONS:  - Educate patient/family on patient safety including physical limitations  - Instruct patient to call for assistance with activity   - Consult OT/PT to assist with strengthening/mobility   - Keep Call bell within reach  - Keep bed low and locked with side rails adjusted as appropriate  - Keep care items and personal belongings within reach  - Initiate and maintain comfort rounds  - Make Fall Risk Sign visible to staff    - Apply yellow socks and  bracelet for high fall risk patients  - Consider moving patient to room near nurses station  Outcome: Progressing  Goal: Maintain or return to baseline ADL function  Description: INTERVENTIONS:  -  Assess patient's ability to carry out ADLs; assess patient's baseline for ADL function and identify physical deficits which impact ability to perform ADLs (bathing, care of mouth/teeth, toileting, grooming, dressing, etc.)  - Assess/evaluate cause of self-care deficits   - Assess range of motion  - Assess patient's mobility; develop plan if impaired  - Assess patient's need for assistive devices and provide as appropriate  - Encourage maximum independence but intervene and supervise when necessary  - Involve family in performance of ADLs  - Assess for home care needs following discharge   - Consider OT consult to assist with ADL evaluation and planning for discharge  - Provide patient education as appropriate  Outcome: Progressing  Goal: Maintains/Returns to pre admission functional level  Description: INTERVENTIONS:  - Perform AM-PAC 6 Click Basic Mobility/ Daily Activity assessment daily.  - Set and communicate daily mobility goal to care team and patient/family/caregiver.   - Collaborate with rehabilitation services on mobility goals if consulted  - Out of bed for toileting  - Record patient progress and toleration of activity level   Outcome: Progressing     Problem: DISCHARGE PLANNING  Goal: Discharge to home or other facility with appropriate resources  Description: INTERVENTIONS:  - Identify barriers to discharge w/patient and caregiver  - Arrange for needed discharge resources and transportation as appropriate  - Identify discharge learning needs (meds, wound care, etc.)  - Arrange for interpretive services to assist at discharge as needed  - Refer to Case Management Department for coordinating discharge planning if the patient needs post-hospital services based on physician/advanced practitioner order or complex  needs related to functional status, cognitive ability, or social support system  Outcome: Progressing     Problem: Knowledge Deficit  Goal: Patient/family/caregiver demonstrates understanding of disease process, treatment plan, medications, and discharge instructions  Description: Complete learning assessment and assess knowledge base.  Interventions:  - Provide teaching at level of understanding  - Provide teaching via preferred learning methods  Outcome: Progressing      Self

## 2024-01-16 LAB
CORE LAB BIOPSY: NORMAL
HCG UR QL: NEGATIVE
QUALITY CONTROL: YES

## 2024-01-17 DIAGNOSIS — Z71.2 PERSON CONSULTING FOR EXPLANATION OF EXAMINATION OR TEST FINDINGS: ICD-10-CM

## 2024-01-18 DIAGNOSIS — Z71.2 PERSON CONSULTING FOR EXPLANATION OF EXAMINATION OR TEST FINDINGS: ICD-10-CM

## 2024-01-22 ENCOUNTER — OUTPATIENT (OUTPATIENT)
Dept: OUTPATIENT SERVICES | Facility: HOSPITAL | Age: 24
LOS: 1 days | End: 2024-01-22
Payer: COMMERCIAL

## 2024-01-22 ENCOUNTER — APPOINTMENT (OUTPATIENT)
Dept: OBGYN | Facility: CLINIC | Age: 24
End: 2024-01-22
Payer: SELF-PAY

## 2024-01-22 DIAGNOSIS — R87.610 ATYPICAL SQUAMOUS CELLS OF UNDETERMINED SIGNIFICANCE ON CYTOLOGIC SMEAR OF CERVIX (ASC-US): ICD-10-CM

## 2024-01-22 DIAGNOSIS — R87.810 ATYPICAL SQUAMOUS CELLS OF UNDETERMINED SIGNIFICANCE ON CYTOLOGIC SMEAR OF CERVIX (ASC-US): ICD-10-CM

## 2024-01-22 DIAGNOSIS — Z71.2 PERSON CONSULTING FOR EXPLANATION OF EXAMINATION OR TEST FINDINGS: ICD-10-CM

## 2024-01-22 PROCEDURE — 99213 OFFICE O/P EST LOW 20 MIN: CPT

## 2024-01-22 PROCEDURE — 99213 OFFICE O/P EST LOW 20 MIN: CPT | Mod: 95

## 2024-01-23 DIAGNOSIS — N91.2 AMENORRHEA, UNSPECIFIED: ICD-10-CM

## 2024-01-23 DIAGNOSIS — R87.610 ATYPICAL SQUAMOUS CELLS OF UNDETERMINED SIGNIFICANCE ON CYTOLOGIC SMEAR OF CERVIX (ASC-US): ICD-10-CM

## 2024-01-23 NOTE — HISTORY OF PRESENT ILLNESS
[FreeTextEntry1] : Patient here for f/u: 1) Colpo results after ASCUS HPV positive 2) Amenorrhea w/u  Patient was sent for additional blood tests - she did not do them yet. SHe was also given a progesterone challenge test. She did not take the progesterone but she recently had a period.

## 2024-01-23 NOTE — PLAN
[FreeTextEntry1] : Patient counselled that her colpo biopsy was good. She needs to repeat her pap and hpv in 1 year  AS far as her amenorrhea - given that she had a period, she does not need to take the PCT. She still needs to do her labs - r/o mosaic rivera.  f/u telehealth visit in 2-3 weeks.

## 2024-01-23 NOTE — REASON FOR VISIT
[Follow-Up] : a follow-up evaluation of [Home] : at home, [unfilled] , at the time of the visit. [Medical Office: ([facility])___] : at the medical office located in  [Patient] : the patient [This encounter was initiated by telehealth (audio with video) and converted to telephone (audio only) due to technical difficulties.] : This encounter was initiated by telehealth (audio with video) and converted to telephone (audio only) due to technical difficulties.